# Patient Record
Sex: MALE | Race: WHITE | NOT HISPANIC OR LATINO | Employment: FULL TIME | URBAN - METROPOLITAN AREA
[De-identification: names, ages, dates, MRNs, and addresses within clinical notes are randomized per-mention and may not be internally consistent; named-entity substitution may affect disease eponyms.]

---

## 2018-04-09 ENCOUNTER — OFFICE VISIT (OUTPATIENT)
Dept: URGENT CARE | Facility: PHYSICIAN GROUP | Age: 58
End: 2018-04-09
Payer: COMMERCIAL

## 2018-04-09 VITALS
HEART RATE: 66 BPM | DIASTOLIC BLOOD PRESSURE: 78 MMHG | WEIGHT: 160 LBS | HEIGHT: 68 IN | SYSTOLIC BLOOD PRESSURE: 118 MMHG | TEMPERATURE: 99 F | BODY MASS INDEX: 24.25 KG/M2 | RESPIRATION RATE: 14 BRPM | OXYGEN SATURATION: 99 %

## 2018-04-09 DIAGNOSIS — T15.91XA FB EYE, RIGHT, INITIAL ENCOUNTER: ICD-10-CM

## 2018-04-09 PROCEDURE — 99202 OFFICE O/P NEW SF 15 MIN: CPT | Performed by: PHYSICIAN ASSISTANT

## 2018-04-09 RX ORDER — IBUPROFEN 200 MG
200 TABLET ORAL EVERY 6 HOURS PRN
COMMUNITY

## 2018-04-09 ASSESSMENT — ENCOUNTER SYMPTOMS
VOMITING: 0
BLURRED VISION: 0
EYE DISCHARGE: 0
ABDOMINAL PAIN: 0
CONSTITUTIONAL NEGATIVE: 1
RESPIRATORY NEGATIVE: 1
EYE REDNESS: 1
FOREIGN BODY SENSATION: 1
CARDIOVASCULAR NEGATIVE: 1
EYE PAIN: 0
PHOTOPHOBIA: 0
NAUSEA: 0
DIARRHEA: 0
DOUBLE VISION: 0

## 2018-04-09 ASSESSMENT — PAIN SCALES - GENERAL: PAINLEVEL: 5=MODERATE PAIN

## 2018-04-09 NOTE — PROGRESS NOTES
"Subjective:      Raiv Pérez is a 58 y.o. male who presents with Eye Problem (Right eye. Onset Sat. )            Eye Problem    The right eye is affected. This is a new problem. The current episode started in the past 7 days. The problem occurs constantly. The problem has been unchanged. The injury mechanism was a foreign body. There is no known exposure to pink eye. He does not wear contacts. Associated symptoms include eye redness and a foreign body sensation. Pertinent negatives include no blurred vision, eye discharge, double vision, nausea, photophobia or vomiting. He has tried water for the symptoms. The treatment provided mild relief.   Possible steel FB in Rt eye    PMH:  has no past medical history on file.  MEDS:   Current Outpatient Prescriptions:   •  ibuprofen (MOTRIN) 200 MG Tab, Take 200 mg by mouth every 6 hours as needed., Disp: , Rfl:   ALLERGIES:   Allergies   Allergen Reactions   • Vicodin [Hydrocodone-Acetaminophen]      SURGHX: No past surgical history on file.  SOCHX:  reports that he has quit smoking. He has never used smokeless tobacco.  FH: family history is not on file.      Review of Systems   Constitutional: Negative.    HENT: Negative.    Eyes: Positive for redness. Negative for blurred vision, double vision, photophobia, pain and discharge.   Respiratory: Negative.    Cardiovascular: Negative.    Gastrointestinal: Negative for abdominal pain, diarrhea, nausea and vomiting.       Medications, Allergies, and current problem list reviewed today in Epic     Objective:     /78   Pulse 66   Temp 37.2 °C (99 °F)   Resp 14   Ht 1.727 m (5' 8\")   Wt 72.6 kg (160 lb)   SpO2 99%   BMI 24.33 kg/m²      Physical Exam   Constitutional: He is oriented to person, place, and time. He appears well-developed and well-nourished. No distress.   HENT:   Head: Normocephalic and atraumatic.   Eyes: EOM and lids are normal. Lids are everted and swept, no foreign bodies found. No foreign body " (???) present in the right eye. Right conjunctiva is injected.   Neck: Normal range of motion. Neck supple.   Cardiovascular: Normal rate, regular rhythm and normal heart sounds.    No murmur heard.  Pulmonary/Chest: Effort normal and breath sounds normal. No respiratory distress. He has no wheezes.   Neurological: He is alert and oriented to person, place, and time.   Skin: Skin is warm and dry. He is not diaphoretic.   Psychiatric: He has a normal mood and affect. His behavior is normal. Judgment and thought content normal.   Nursing note and vitals reviewed.              Assessment/Plan:     1. FB eye, right, initial encounter       Possible FB w/ corneal abrasion. Fluorescein dye strips on backorder so unable to properly evaluate pt.  Appt with [facility] made for pt. He will report to their office for proper eval immediately.    Please note that this dictation was created using voice recognition software. I have made every reasonable attempt to correct obvious errors, but I expect that there are errors of grammar and possibly content that I did not discover before finalizing the note.